# Patient Record
Sex: FEMALE | URBAN - METROPOLITAN AREA
[De-identification: names, ages, dates, MRNs, and addresses within clinical notes are randomized per-mention and may not be internally consistent; named-entity substitution may affect disease eponyms.]

---

## 2020-12-01 ENCOUNTER — APPOINTMENT (RX ONLY)
Dept: URBAN - METROPOLITAN AREA CLINIC 133 | Facility: CLINIC | Age: 30
Setting detail: DERMATOLOGY
End: 2020-12-01

## 2020-12-01 DIAGNOSIS — L23.0 ALLERGIC CONTACT DERMATITIS DUE TO METALS: ICD-10-CM | Status: RESOLVING

## 2020-12-01 PROCEDURE — 99202 OFFICE O/P NEW SF 15 MIN: CPT

## 2020-12-01 PROCEDURE — ? COUNSELING

## 2020-12-01 PROCEDURE — ? TREATMENT REGIMEN

## 2020-12-01 ASSESSMENT — LOCATION ZONE DERM: LOCATION ZONE: ARM

## 2020-12-01 ASSESSMENT — LOCATION DETAILED DESCRIPTION DERM: LOCATION DETAILED: RIGHT VENTRAL WRIST

## 2020-12-01 ASSESSMENT — LOCATION SIMPLE DESCRIPTION DERM: LOCATION SIMPLE: RIGHT WRIST

## 2020-12-01 NOTE — PROCEDURE: TREATMENT REGIMEN
Otc Regimen: Moisturizers to area of rash\\nCan use Cortisone 10 if area is still itching
Plan: The most common metals resulting in allergy are nickel, cobalt and chromates. \\nDiscussed with patient that rachele gold is usually a combination of yellow gold and copper, and many products also include silver. The percentage of each varies by product. \\nI believe that she is allergic to one of these metals, or it’s possible that trace amounts of nickel made its way into the jewelry item during the manufacturing process. \\nPatient can try jewelry made out of medical grade stainless steel, solid gold, titanium, or platinum, as these metals are less likely to cause an allergic reaction. \\nIf rashes persists, patch testing may be considered.
Detail Level: Zone

## 2020-12-01 NOTE — HPI: RASH
What Type Of Note Output Would You Prefer (Optional)?: Standard Output
Is The Patient Presenting As Previously Scheduled?: Yes
How Severe Is Your Rash?: moderate
Is This A New Presentation, Or A Follow-Up?: Rash
Additional History: Patient states that she thinks she is allergic to her [employer]el[employer] bracelet. States she wore it for a while without issue, but now when she wears it a rash quickly develops only affecting the area of skin the bracelet touches. Patient notes bracelet is made out of rachele gold. Denies systemic symptoms with rash, only redness, scaling and itching. Patient has photos on her phone of when rash last happened.